# Patient Record
Sex: FEMALE | Race: BLACK OR AFRICAN AMERICAN | NOT HISPANIC OR LATINO | URBAN - METROPOLITAN AREA
[De-identification: names, ages, dates, MRNs, and addresses within clinical notes are randomized per-mention and may not be internally consistent; named-entity substitution may affect disease eponyms.]

---

## 2021-04-09 ENCOUNTER — EMERGENCY (EMERGENCY)
Facility: HOSPITAL | Age: 31
LOS: 1 days | Discharge: HOME | End: 2021-04-09
Attending: EMERGENCY MEDICINE | Admitting: EMERGENCY MEDICINE
Payer: MEDICAID

## 2021-04-09 VITALS
TEMPERATURE: 98 F | DIASTOLIC BLOOD PRESSURE: 70 MMHG | HEART RATE: 52 BPM | SYSTOLIC BLOOD PRESSURE: 110 MMHG | OXYGEN SATURATION: 100 % | RESPIRATION RATE: 16 BRPM

## 2021-04-09 VITALS
WEIGHT: 136.03 LBS | HEIGHT: 63 IN | OXYGEN SATURATION: 99 % | SYSTOLIC BLOOD PRESSURE: 129 MMHG | RESPIRATION RATE: 18 BRPM | DIASTOLIC BLOOD PRESSURE: 70 MMHG | HEART RATE: 48 BPM | TEMPERATURE: 99 F

## 2021-04-09 DIAGNOSIS — K52.9 NONINFECTIVE GASTROENTERITIS AND COLITIS, UNSPECIFIED: ICD-10-CM

## 2021-04-09 DIAGNOSIS — R10.84 GENERALIZED ABDOMINAL PAIN: ICD-10-CM

## 2021-04-09 DIAGNOSIS — R00.1 BRADYCARDIA, UNSPECIFIED: ICD-10-CM

## 2021-04-09 LAB
ALBUMIN SERPL ELPH-MCNC: 4.1 G/DL — SIGNIFICANT CHANGE UP (ref 3.4–5)
ALP SERPL-CCNC: 60 U/L — SIGNIFICANT CHANGE UP (ref 40–120)
ALT FLD-CCNC: 26 U/L — SIGNIFICANT CHANGE UP (ref 12–42)
ANION GAP SERPL CALC-SCNC: 12 MMOL/L — SIGNIFICANT CHANGE UP (ref 9–16)
AST SERPL-CCNC: 16 U/L — SIGNIFICANT CHANGE UP (ref 15–37)
BASOPHILS # BLD AUTO: 0.02 K/UL — SIGNIFICANT CHANGE UP (ref 0–0.2)
BASOPHILS NFR BLD AUTO: 0.2 % — SIGNIFICANT CHANGE UP (ref 0–2)
BILIRUB SERPL-MCNC: 0.4 MG/DL — SIGNIFICANT CHANGE UP (ref 0.2–1.2)
BUN SERPL-MCNC: 10 MG/DL — SIGNIFICANT CHANGE UP (ref 7–23)
CALCIUM SERPL-MCNC: 9.3 MG/DL — SIGNIFICANT CHANGE UP (ref 8.5–10.5)
CHLORIDE SERPL-SCNC: 106 MMOL/L — SIGNIFICANT CHANGE UP (ref 96–108)
CO2 SERPL-SCNC: 24 MMOL/L — SIGNIFICANT CHANGE UP (ref 22–31)
CREAT SERPL-MCNC: 0.68 MG/DL — SIGNIFICANT CHANGE UP (ref 0.5–1.3)
EOSINOPHIL # BLD AUTO: 0 K/UL — SIGNIFICANT CHANGE UP (ref 0–0.5)
EOSINOPHIL NFR BLD AUTO: 0 % — SIGNIFICANT CHANGE UP (ref 0–6)
GLUCOSE SERPL-MCNC: 103 MG/DL — HIGH (ref 70–99)
HCG SERPL-ACNC: 1 MIU/ML — SIGNIFICANT CHANGE UP
HCT VFR BLD CALC: 37.4 % — SIGNIFICANT CHANGE UP (ref 34.5–45)
HGB BLD-MCNC: 12.5 G/DL — SIGNIFICANT CHANGE UP (ref 11.5–15.5)
IMM GRANULOCYTES NFR BLD AUTO: 0.3 % — SIGNIFICANT CHANGE UP (ref 0–1.5)
LACTATE SERPL-SCNC: 1.8 MMOL/L — SIGNIFICANT CHANGE UP (ref 0.4–2)
LIDOCAIN IGE QN: 28 U/L — LOW (ref 73–393)
LYMPHOCYTES # BLD AUTO: 1.13 K/UL — SIGNIFICANT CHANGE UP (ref 1–3.3)
LYMPHOCYTES # BLD AUTO: 13.1 % — SIGNIFICANT CHANGE UP (ref 13–44)
MCHC RBC-ENTMCNC: 28.6 PG — SIGNIFICANT CHANGE UP (ref 27–34)
MCHC RBC-ENTMCNC: 33.4 GM/DL — SIGNIFICANT CHANGE UP (ref 32–36)
MCV RBC AUTO: 85.6 FL — SIGNIFICANT CHANGE UP (ref 80–100)
MONOCYTES # BLD AUTO: 0.23 K/UL — SIGNIFICANT CHANGE UP (ref 0–0.9)
MONOCYTES NFR BLD AUTO: 2.7 % — SIGNIFICANT CHANGE UP (ref 2–14)
NEUTROPHILS # BLD AUTO: 7.23 K/UL — SIGNIFICANT CHANGE UP (ref 1.8–7.4)
NEUTROPHILS NFR BLD AUTO: 83.7 % — HIGH (ref 43–77)
NRBC # BLD: 0 /100 WBCS — SIGNIFICANT CHANGE UP (ref 0–0)
PLATELET # BLD AUTO: 222 K/UL — SIGNIFICANT CHANGE UP (ref 150–400)
POTASSIUM SERPL-MCNC: 4 MMOL/L — SIGNIFICANT CHANGE UP (ref 3.5–5.3)
POTASSIUM SERPL-SCNC: 4 MMOL/L — SIGNIFICANT CHANGE UP (ref 3.5–5.3)
PROT SERPL-MCNC: 7.8 G/DL — SIGNIFICANT CHANGE UP (ref 6.4–8.2)
RBC # BLD: 4.37 M/UL — SIGNIFICANT CHANGE UP (ref 3.8–5.2)
RBC # FLD: 14.3 % — SIGNIFICANT CHANGE UP (ref 10.3–14.5)
SODIUM SERPL-SCNC: 142 MMOL/L — SIGNIFICANT CHANGE UP (ref 132–145)
TROPONIN I SERPL-MCNC: <0.017 NG/ML — LOW (ref 0.02–0.06)
WBC # BLD: 8.64 K/UL — SIGNIFICANT CHANGE UP (ref 3.8–10.5)
WBC # FLD AUTO: 8.64 K/UL — SIGNIFICANT CHANGE UP (ref 3.8–10.5)

## 2021-04-09 PROCEDURE — 93010 ELECTROCARDIOGRAM REPORT: CPT

## 2021-04-09 PROCEDURE — 99284 EMERGENCY DEPT VISIT MOD MDM: CPT | Mod: 25

## 2021-04-09 RX ORDER — FAMOTIDINE 10 MG/ML
20 INJECTION INTRAVENOUS ONCE
Refills: 0 | Status: COMPLETED | OUTPATIENT
Start: 2021-04-09 | End: 2021-04-09

## 2021-04-09 RX ORDER — ONDANSETRON 8 MG/1
1 TABLET, FILM COATED ORAL
Qty: 10 | Refills: 0
Start: 2021-04-09

## 2021-04-09 RX ORDER — KETOROLAC TROMETHAMINE 30 MG/ML
15 SYRINGE (ML) INJECTION ONCE
Refills: 0 | Status: DISCONTINUED | OUTPATIENT
Start: 2021-04-09 | End: 2021-04-09

## 2021-04-09 RX ORDER — SODIUM CHLORIDE 9 MG/ML
1000 INJECTION INTRAMUSCULAR; INTRAVENOUS; SUBCUTANEOUS ONCE
Refills: 0 | Status: COMPLETED | OUTPATIENT
Start: 2021-04-09 | End: 2021-04-09

## 2021-04-09 RX ORDER — ONDANSETRON 8 MG/1
4 TABLET, FILM COATED ORAL ONCE
Refills: 0 | Status: COMPLETED | OUTPATIENT
Start: 2021-04-09 | End: 2021-04-09

## 2021-04-09 RX ADMIN — SODIUM CHLORIDE 1000 MILLILITER(S): 9 INJECTION INTRAMUSCULAR; INTRAVENOUS; SUBCUTANEOUS at 13:57

## 2021-04-09 RX ADMIN — FAMOTIDINE 20 MILLIGRAM(S): 10 INJECTION INTRAVENOUS at 13:56

## 2021-04-09 RX ADMIN — Medication 15 MILLIGRAM(S): at 14:45

## 2021-04-09 RX ADMIN — Medication 20 MILLIGRAM(S): at 15:41

## 2021-04-09 RX ADMIN — ONDANSETRON 4 MILLIGRAM(S): 8 TABLET, FILM COATED ORAL at 13:56

## 2021-04-09 NOTE — ED ADULT NURSE NOTE - OBJECTIVE STATEMENT
Pt BIBA from [facility] MD for further evaluation of N/V/D and mid abdominal pain sudden onset since this morning 0500. Pt reports eating salmon for dinner at a restaurant and thinks it may have not been cooked fully, no other guests ate the same. Pt given 10mg Reglan IM at [facility] PTA with little relief. Pt denies any fever/chills.

## 2021-04-09 NOTE — ED PROVIDER NOTE - GASTROINTESTINAL, MLM
Abdomen soft, nondistended, diffusely tender to all quadrants, +voluntary guarding, no rebound, no localized tenderness, no masses or hernias palpated. bowel sounds active, no CVA tenderness

## 2021-04-09 NOTE — ED PROVIDER NOTE - OBJECTIVE STATEMENT
31 yo F w/ no pertinent PMHx or PSHx presents to the ED with acute onset of diffuse abdominal cramping, nausea, and intractable vomiting at 5 AM this morning. Pt reports 1 epsode of diarrhea; Denies fever, chills, hematemesis, coffee ground emesis, melena, hematochezia, hematuria, change in urinary/bowel function, dysuria, vaginal bleeding, d/c, flank pain, HA, dizziness, weakness, CP, SOB, cough; no recent travel or COVID exposures. Pt attributes symptoms to possible food poisoning after dinner 10 PM last night; pt ate cooked shrimp and salmon. Pt reports no Hx of GI complaints or no Hx of reaction to seafood in the past; Pt reports no one else ate the same meal. Pt was seen at [facility] earlier today; rapid COVID negative. Pt was given 10mg of reglan for nausea; Pt was sent in b/c they were unable to perform abdominal exam.

## 2021-04-09 NOTE — ED PROVIDER NOTE - PROGRESS NOTE DETAILS
Pt improved, labs unremarkable, pregnancy negative. repeat abdominal exam unremarkable. Will attempt PO challenge. will discharge home if pt completes PO challenge. Pt is tolerating PO; will discharge home with prescription for anti-emetics.

## 2021-04-09 NOTE — ED PROVIDER NOTE - ENMT, MLM
Airway patent, mucous membranes moist. Throat has no vesicles, no oropharyngeal exudates and uvula is midline. neck supple

## 2021-04-09 NOTE — ED PROVIDER NOTE - PHYSICAL EXAMINATION
nondistended, diffusely tender to all quadrants, +voluntary guarding, no rebound, no localized tenderness, no masses or hernias palpated. bowel sounds active, no CVA tenderness

## 2021-04-09 NOTE — ED PROVIDER NOTE - NSFOLLOWUPINSTRUCTIONS_ED_ALL_ED_FT
Food Poisoning    Food poisoning is an illness that is caused by eating or drinking contaminated foods or drinks. In most cases, food poisoning is mild and lasts 1–2 days. However, some cases can be serious, especially for people who have weak body defense (immune) systems, older people, children and infants, and pregnant women.    What are the causes?  Foods can become contaminated with viruses, bacteria, parasites, mold, or chemicals as a result of:    Poor personal hygiene, such as poor hand washing practices.  Storing food improperly, such as not refrigerating raw meat.  Using unclean surfaces for serving, preparing, and storing food.  Cooking or eating with unclean utensils.    If contaminated food is eaten, viruses, bacteria, or parasites can harm the intestine. This often causes severe diarrhea. The most common causes of food poisoning include:    Viruses, such as:  Norovirus.  Rotavirus.  Bacteria, such as:  Salmonella.  Listeria.  E. coli (Escherichia coli).  Parasites, such as:  Giardia.  Toxoplasmosis.    What are the signs or symptoms?  Symptoms may take several hours to appear after you consume contaminated food or drink. Symptoms include:    Nausea.  Vomiting.  Cramping.  Diarrhea.  Fever and chills.  Muscle aches.  Dehydration. Dehydration can cause you to be tired and thirsty, have a dry mouth, and urinate less frequently.    How is this diagnosed?  Your health care provider can diagnose food poisoning with a medical history and physical exam. This will include asking you what you have recently eaten. You may also have tests, including:    Blood tests.  Stool tests.    How is this treated?  Treatment focuses on relieving your symptoms and making sure that you are hydrated. You may also be given medicines. In severe cases, hospitalization may be required and you may need to receive fluids through an IV tube.    Follow these instructions at home:  Eating and drinking     Drink enough fluids to keep your urine clear or pale yellow. You may need to drink small amounts of clear liquids frequently.  Avoid milk, caffeine, and alcohol.  Ask your health care provider for specific rehydration instructions.  Eat small, frequent meals rather than large meals.    Medicines    Take over-the-counter and prescription medicines only as told by your health care provider. Ask your health care provider if you should continue to take any of your regular prescribed and over-the-counter medicines.  If you were prescribed an antibiotic medicine, take it as told by your health care provider. Do not stop taking the antibiotic even if you start to feel better.    General instructions    Wash your hands thoroughly before you prepare food and after you go to the bathroom (use the toilet). Make sure people who live with you also wash their hands often.  Clean surfaces that you touch with a product that contains chlorine bleach.  Keep all follow-up visits as told by your health care provider. This is important.    How is this prevented?  Wash your hands, food preparation surfaces, and utensils thoroughly before and after you handle raw foods.  Use separate food preparation surfaces and storage spaces for raw meat and for fruits and vegetables.  Keep refrigerated foods colder than 40°F (5°C).  Serve hot foods immediately or keep them heated above 140°F (60°C).  Store dry foods in cool, dry spaces away from excess heat or moisture. Throw out any foods that do not smell right or are in cans that are bulging.  Follow approved melania procedures.  Heat canned foods thoroughly before you taste them.  Drink bottled or sterile water when you travel.    Get help right away if:  Call 911 or go to the emergency room if:    You have difficulty breathing, swallowing, talking, or moving.  You develop blurred vision.  You cannot eat or drink without vomiting.  You faint.  Your eyes turn yellow.  Your vomiting or diarrhea is persistent.  Abdominal pain develops, increases, or localizes in one small area.  You have a fever.  You have blood or mucus in your stools, or your stools look dark black and tarry.  You have signs of dehydration, such as:  Dark urine, very little urine, or no urine.  Cracked lips.  Not making tears while crying.  Dry mouth.  Sunken eyes.  Sleepiness.  Weakness.  Dizziness.

## 2021-04-09 NOTE — ED PROVIDER NOTE - PATIENT PORTAL LINK FT
You can access the FollowMyHealth Patient Portal offered by [facility] by registering at the following website: [email or web address]. By joining [employer]’s FollowMyHealth portal, you will also be able to view your health information using other applications (apps) compatible with our system.

## 2021-04-09 NOTE — ED PROVIDER NOTE - CLINICAL SUMMARY MEDICAL DECISION MAKING FREE TEXT BOX
Pt presents to the ED with acute onset of abdominal cramping and N/V today in the setting of food ingestion. DDx: viral gastroenteritis, foodborne illness, gastritis, gallstones, pancreatitis, bowel obstruction, appendicitis, pyelonephritis. Will order labs including lipase and lactate, Upreg and UA. IV fluids, Toradol, Zofran, GI cocktail and bentyl ordered for symptom relief. CT imaging not clinically indicated at this time. Will reassess.

## 2021-04-09 NOTE — ED ADULT NURSE NOTE - CHIEF COMPLAINT QUOTE
BIBA from [facility] c/o N/V and one episode of diarrhea since 5am. reports eating salmon last night. h/o Asthma.  given 10mg Reglan IM at [facility] PTA.

## 2021-04-09 NOTE — ED ADULT TRIAGE NOTE - CHIEF COMPLAINT QUOTE
BIBA from [facility] c/o N/V and one episode of diarrhea since 5am. reports eating salmon last night. h/o Asthma.  given 10mg Reglan IM at [facility] PTA.